# Patient Record
Sex: MALE | Employment: FULL TIME | URBAN - METROPOLITAN AREA
[De-identification: names, ages, dates, MRNs, and addresses within clinical notes are randomized per-mention and may not be internally consistent; named-entity substitution may affect disease eponyms.]

---
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
## 2023-01-10 DIAGNOSIS — F98.8 ATTENTION DEFICIT DISORDER (ADD) IN ADULT: ICD-10-CM

## 2023-01-10 RX ORDER — DEXTROAMPHETAMINE SACCHARATE, AMPHETAMINE ASPARTATE MONOHYDRATE, DEXTROAMPHETAMINE SULFATE AND AMPHETAMINE SULFATE 6.25; 6.25; 6.25; 6.25 MG/1; MG/1; MG/1; MG/1
25 CAPSULE, EXTENDED RELEASE ORAL EVERY MORNING
Qty: 30 CAPSULE | Refills: 0 | Status: SHIPPED | OUTPATIENT
Start: 2023-01-10 | End: 2023-02-21 | Stop reason: SDUPTHER

## 2023-01-10 NOTE — TELEPHONE ENCOUNTER
Caller: Darrian Son    Relationship: Self    Best call back number:     [phone]    Requested Prescriptions:   Requested Prescriptions     Pending Prescriptions Disp Refills   • amphetamine-dextroamphetamine XR (Adderall XR) 25 MG 24 hr capsule 30 capsule 0     Sig: Take 1 capsule by mouth Every Morning      Pharmacy where request should be sent: [facility] [address] [place] [address] [phone] [facility] [phone] FX     Additional details provided by patient:     PATIENT STATED  HE IS OUT OF THE MEDICATION    Does the patient have less than a 3 day supply:  [x] Yes  [] No    Would you like a call back once the refill request has been completed: [x] Yes [] No    If the office needs to give you a call back, can they leave a voicemail: [x] Yes [] No    Marie Joseph Rep   01/10/23 11:30 EST     DR MERCADO

## 2023-02-21 ENCOUNTER — TELEPHONE (OUTPATIENT)
Dept: FAMILY MEDICINE CLINIC | Facility: CLINIC | Age: 18
End: 2023-02-21
Payer: COMMERCIAL

## 2023-02-21 DIAGNOSIS — F98.8 ATTENTION DEFICIT DISORDER (ADD) IN ADULT: ICD-10-CM

## 2023-02-21 RX ORDER — DEXTROAMPHETAMINE SACCHARATE, AMPHETAMINE ASPARTATE MONOHYDRATE, DEXTROAMPHETAMINE SULFATE AND AMPHETAMINE SULFATE 6.25; 6.25; 6.25; 6.25 MG/1; MG/1; MG/1; MG/1
25 CAPSULE, EXTENDED RELEASE ORAL EVERY MORNING
Qty: 30 CAPSULE | Refills: 0 | Status: SHIPPED | OUTPATIENT
Start: 2023-02-21 | End: 2023-03-14 | Stop reason: SDUPTHER

## 2023-02-21 NOTE — TELEPHONE ENCOUNTER
amphetamine-dextroamphetamine XR (Adderall XR) 25 MG 24 hr capsule    [facility] in [facility] needed to be sent there     Patient has less than 3 day supply

## 2023-02-22 ENCOUNTER — TELEPHONE (OUTPATIENT)
Dept: FAMILY MEDICINE CLINIC | Facility: CLINIC | Age: 18
End: 2023-02-22
Payer: COMMERCIAL

## 2023-03-14 DIAGNOSIS — F98.8 ATTENTION DEFICIT DISORDER (ADD) IN ADULT: ICD-10-CM

## 2023-03-14 RX ORDER — DEXTROAMPHETAMINE SACCHARATE, AMPHETAMINE ASPARTATE MONOHYDRATE, DEXTROAMPHETAMINE SULFATE AND AMPHETAMINE SULFATE 6.25; 6.25; 6.25; 6.25 MG/1; MG/1; MG/1; MG/1
25 CAPSULE, EXTENDED RELEASE ORAL EVERY MORNING
Qty: 30 CAPSULE | Refills: 0 | Status: SHIPPED | OUTPATIENT
Start: 2023-03-14 | End: 2023-03-20

## 2023-03-14 NOTE — TELEPHONE ENCOUNTER
Pt stated he is out of his adderall, and has called [facility], [facility], [facility], and [facility] in [place] and none of them have it available. Pt wondering if there's anything else he can do or take? Pt was also sent to front to schedule an appt with Dr Cuello.

## 2023-03-20 ENCOUNTER — OFFICE VISIT (OUTPATIENT)
Dept: FAMILY MEDICINE CLINIC | Facility: CLINIC | Age: 18
End: 2023-03-20
Payer: COMMERCIAL

## 2023-03-20 VITALS
OXYGEN SATURATION: 98 % | SYSTOLIC BLOOD PRESSURE: 118 MMHG | BODY MASS INDEX: 30.13 KG/M2 | DIASTOLIC BLOOD PRESSURE: 68 MMHG | TEMPERATURE: 97.7 F | HEIGHT: 77 IN | HEART RATE: 57 BPM | WEIGHT: 255.2 LBS | RESPIRATION RATE: 20 BRPM

## 2023-03-20 DIAGNOSIS — F98.8 ATTENTION DEFICIT DISORDER (ADD) IN ADULT: Primary | ICD-10-CM

## 2023-03-20 PROCEDURE — 99213 OFFICE O/P EST LOW 20 MIN: CPT | Performed by: FAMILY MEDICINE

## 2023-03-20 RX ORDER — DEXTROAMPHETAMINE SACCHARATE, AMPHETAMINE ASPARTATE, DEXTROAMPHETAMINE SULFATE AND AMPHETAMINE SULFATE 5; 5; 5; 5 MG/1; MG/1; MG/1; MG/1
20 TABLET ORAL 2 TIMES DAILY
Qty: 60 TABLET | Refills: 0 | Status: SHIPPED | OUTPATIENT
Start: 2023-03-20

## 2023-03-20 RX ORDER — DEXTROAMPHETAMINE SACCHARATE, AMPHETAMINE ASPARTATE, DEXTROAMPHETAMINE SULFATE AND AMPHETAMINE SULFATE 5; 5; 5; 5 MG/1; MG/1; MG/1; MG/1
20 TABLET ORAL DAILY
Qty: 60 TABLET | Refills: 0 | Status: SHIPPED | OUTPATIENT
Start: 2023-03-20 | End: 2023-03-20 | Stop reason: SDUPTHER

## 2023-03-20 NOTE — PROGRESS NOTES
"Chief Complaint   Patient presents with   • Follow-up   • ADD       Subjective      Darrian Son is a 17 y.o. who presents for ADHD follow-up.  Patient has run into difficulties obtaining Adderall XR due to nationwide shortage.  It was time for follow-up and primary issue to address is alternative medications.  Patient notes improvement in focus and concentration as well as ability to retain information with use of medication.  Apparently it is quite obvious when he has not taken his medication.    Objective   Vital Signs:  /68   Pulse 57   Temp 97.7 °F (36.5 °C)   Resp 20   Ht 195.6 cm (77\")   Wt 116 kg (255 lb 3.2 oz)   SpO2 98%   BMI 30.26 kg/m²     Physical Exam  Constitutional:       Appearance: Normal appearance.   Cardiovascular:      Rate and Rhythm: Regular rhythm.      Pulses: Normal pulses.      Heart sounds: Normal heart sounds.   Pulmonary:      Effort: Pulmonary effort is normal.   Neurological:      Mental Status: He is alert.          Result Review                     Assessment and Plan  Diagnoses and all orders for this visit:    1. Attention deficit disorder (ADD) in adult (Primary)  -     Discontinue: amphetamine-dextroamphetamine (Adderall) 20 MG tablet; Take 1 tablet by mouth Daily.  Dispense: 60 tablet; Refill: 0  -     amphetamine-dextroamphetamine (Adderall) 20 MG tablet; Take 1 tablet by mouth 2 (Two) Times a Day. Disregard first rx that says once daily  Dispense: 60 tablet; Refill: 0            Follow Up  No follow-ups on file.  Patient was given instructions and counseling regarding his condition or for health maintenance advice. Please see specific information pulled into the AVS if appropriate.       "

## 2023-05-10 DIAGNOSIS — F98.8 ATTENTION DEFICIT DISORDER (ADD) IN ADULT: ICD-10-CM

## 2023-05-10 RX ORDER — DEXTROAMPHETAMINE SACCHARATE, AMPHETAMINE ASPARTATE, DEXTROAMPHETAMINE SULFATE AND AMPHETAMINE SULFATE 5; 5; 5; 5 MG/1; MG/1; MG/1; MG/1
20 TABLET ORAL 2 TIMES DAILY
Qty: 60 TABLET | Refills: 0 | Status: SHIPPED | OUTPATIENT
Start: 2023-05-10

## 2023-05-10 NOTE — TELEPHONE ENCOUNTER
Caller: Darrian Son    Relationship: Self    Best call back number: [phone]    Requested Prescriptions:   Requested Prescriptions     Pending Prescriptions Disp Refills   • amphetamine-dextroamphetamine (Adderall) 20 MG tablet 60 tablet 0     Sig: Take 1 tablet by mouth 2 (Two) Times a Day. Disregard first rx that says once daily        Pharmacy where request should be sent: [place] [facility] [phone] [place] [phone]      Last office visit with prescribing clinician: 3/20/2023   Last telemedicine visit with prescribing clinician: 3/20/2023   Next office visit with prescribing clinician: Visit date not found     Additional details provided by patient: PATIENT STATES HE HAS 2 DAYS LEFT OF HIS PRESCRIPTION.     PATIENT IS REQUESTING TO HAVE THE LONG TERM 25 MG DOSAGE AS HE USED TO BE ON IT BUT SWITCHED TO 20 MG BECAUSE THEY WERE OUT OF THE 25 MG.     Does the patient have less than a 3 day supply:  [x] Yes  [] No    Marie Burr Rep   05/10/23 11:05 EDT

## 2023-07-14 ENCOUNTER — TELEPHONE (OUTPATIENT)
Dept: FAMILY MEDICINE CLINIC | Facility: CLINIC | Age: 18
End: 2023-07-14

## 2023-07-14 NOTE — TELEPHONE ENCOUNTER
Caller: Darrian Son    Relationship: Self    Best call back number: [phone]    Who are you requesting to speak with (clinical staff, provider,  specific staff member): CLINICAL     What was the call regarding: ADDERRAL: PATIENT WAS SWITCHED TO REGULAR ADDERALL BECAUSE THE PHARMACY WAS OUT.  PATIENT IS ASKING TO BE OUT BACK ON THE ADDERRAL XR.

## 2023-09-05 DIAGNOSIS — F98.8 ATTENTION DEFICIT DISORDER (ADD) IN ADULT: ICD-10-CM

## 2023-09-05 RX ORDER — DEXTROAMPHETAMINE SACCHARATE, AMPHETAMINE ASPARTATE MONOHYDRATE, DEXTROAMPHETAMINE SULFATE AND AMPHETAMINE SULFATE 5; 5; 5; 5 MG/1; MG/1; MG/1; MG/1
20 CAPSULE, EXTENDED RELEASE ORAL EVERY MORNING
Qty: 30 CAPSULE | Refills: 0 | Status: SHIPPED | OUTPATIENT
Start: 2023-09-05

## 2023-09-05 RX ORDER — DEXTROAMPHETAMINE SACCHARATE, AMPHETAMINE ASPARTATE, DEXTROAMPHETAMINE SULFATE AND AMPHETAMINE SULFATE 5; 5; 5; 5 MG/1; MG/1; MG/1; MG/1
20 TABLET ORAL 2 TIMES DAILY
Qty: 60 TABLET | Refills: 0 | Status: CANCELLED | OUTPATIENT
Start: 2023-09-05

## 2023-09-05 NOTE — TELEPHONE ENCOUNTER
Caller: Darrian Son    Relationship: Self    Best call back number: [phone]     Requested Prescriptions:   Requested Prescriptions     Pending Prescriptions Disp Refills    amphetamine-dextroamphetamine (Adderall) 20 MG tablet 60 tablet 0     Sig: Take 1 tablet by mouth 2 (Two) Times a Day. Disregard first rx that says once daily        Pharmacy where request should be sent: [facility] [address] [place] [address] [phone] [facility] [phone] FX     Last office visit with prescribing clinician: 3/20/2023   Last telemedicine visit with prescribing clinician: Visit date not found   Next office visit with prescribing clinician: Visit date not found     Additional details provided by patient: has none left, asking for the xr version    Does the patient have less than a 3 day supply:  [x] Yes  [] No    Would you like a call back once the refill request has been completed: [x] Yes [] No    If the office needs to give you a call back, can they leave a voicemail: [] Yes [] No    Marie Sherman Rep   09/05/23 11:17 EDT

## 2023-10-11 DIAGNOSIS — F98.8 ATTENTION DEFICIT DISORDER (ADD) IN ADULT: ICD-10-CM

## 2023-10-11 NOTE — TELEPHONE ENCOUNTER
Caller: Darrian Son    Relationship: Self    Best call back number: [phone]     Requested Prescriptions:   Requested Prescriptions     Pending Prescriptions Disp Refills    amphetamine-dextroamphetamine XR (Adderall XR) 20 MG 24 hr capsule 30 capsule 0     Sig: Take 1 capsule by mouth Every Morning        Pharmacy where request should be sent: [facility] [address] [place] [address] [phone] [facility] [phone] FX     Last office visit with prescribing clinician: 3/20/2023   Last telemedicine visit with prescribing clinician: Visit date not found   Next office visit with prescribing clinician: Visit date not found     Additional details provided by patient: PATIENT HAS 4 DAYS REMAINING.    Does the patient have less than a 3 day supply:  [] Yes  [x] No    Would you like a call back once the refill request has been completed: [] Yes [x] No    If the office needs to give you a call back, can they leave a voicemail: [] Yes [x] No    Marie Tian Rep   10/11/23 14:50 EDT

## 2023-10-12 RX ORDER — DEXTROAMPHETAMINE SACCHARATE, AMPHETAMINE ASPARTATE MONOHYDRATE, DEXTROAMPHETAMINE SULFATE AND AMPHETAMINE SULFATE 5; 5; 5; 5 MG/1; MG/1; MG/1; MG/1
20 CAPSULE, EXTENDED RELEASE ORAL EVERY MORNING
Qty: 30 CAPSULE | Refills: 0 | Status: SHIPPED | OUTPATIENT
Start: 2023-10-12

## 2023-11-15 DIAGNOSIS — F98.8 ATTENTION DEFICIT DISORDER (ADD) IN ADULT: ICD-10-CM

## 2023-11-15 RX ORDER — DEXTROAMPHETAMINE SACCHARATE, AMPHETAMINE ASPARTATE MONOHYDRATE, DEXTROAMPHETAMINE SULFATE AND AMPHETAMINE SULFATE 5; 5; 5; 5 MG/1; MG/1; MG/1; MG/1
20 CAPSULE, EXTENDED RELEASE ORAL EVERY MORNING
Qty: 30 CAPSULE | Refills: 0 | Status: SHIPPED | OUTPATIENT
Start: 2023-11-15

## 2023-11-15 NOTE — TELEPHONE ENCOUNTER
Caller: Darrian Son    Relationship: Self    Best call back number:       [phone] (Mobile)     Requested Prescriptions:   Requested Prescriptions     Pending Prescriptions Disp Refills    amphetamine-dextroamphetamine XR (Adderall XR) 20 MG 24 hr capsule 30 capsule 0     Sig: Take 1 capsule by mouth Every Morning      Pharmacy where request should be sent:     [facility] [address] [place] [address] [phone] [facility] [phone]      Last office visit with prescribing clinician: 3/20/2023   Last telemedicine visit with prescribing clinician: Visit date not found   Next office visit with prescribing clinician: Visit date not found     Additional details provided by patient:     PATIENT STATED HE IS COMPLETELY OUT OF MEDICATION    Does the patient have less than a 3 day supply:  [x] Yes  [] No    Would you like a call back once the refill request has been completed: [] Yes [] No    If the office needs to give you a call back, can they leave a voicemail: [] Yes [] No    Marie Joseph Rep   11/15/23 08:53 EST

## 2023-12-14 DIAGNOSIS — F98.8 ATTENTION DEFICIT DISORDER (ADD) IN ADULT: ICD-10-CM

## 2023-12-14 RX ORDER — DEXTROAMPHETAMINE SACCHARATE, AMPHETAMINE ASPARTATE MONOHYDRATE, DEXTROAMPHETAMINE SULFATE AND AMPHETAMINE SULFATE 5; 5; 5; 5 MG/1; MG/1; MG/1; MG/1
20 CAPSULE, EXTENDED RELEASE ORAL EVERY MORNING
Qty: 30 CAPSULE | Refills: 0 | Status: SHIPPED | OUTPATIENT
Start: 2023-12-14

## 2023-12-14 NOTE — TELEPHONE ENCOUNTER
Caller: Darrian Sno    Relationship: Self    Best call back number: [phone]     Requested Prescriptions:   Requested Prescriptions     Pending Prescriptions Disp Refills    amphetamine-dextroamphetamine XR (Adderall XR) 20 MG 24 hr capsule 30 capsule 0     Sig: Take 1 capsule by mouth Every Morning        Pharmacy where request should be sent: [facility] [address] [place] [address] [phone] [facility] [phone] FX     Last office visit with prescribing clinician: 3/20/2023   Last telemedicine visit with prescribing clinician: Visit date not found   Next office visit with prescribing clinician: Visit date not found     Additional details provided by patient: PATIENT HAS CALLED REQUESTING A NEW PRESCRIPTION ON ABOVE MEDICATION TO BE SENT TO [facility] IN [place].PATIENT IS REQUESTING A 90 DAY PRESCRIPTION. PATIENT WILL BE OUT OF TOWN ON A MISSION AND IS REQUESTING A 90 DAY PRESCRIPTION.    Does the patient have less than a 3 day supply:  [x] Yes  [] No    Would you like a call back once the refill request has been completed: [] Yes [x] No    If the office needs to give you a call back, can they leave a voicemail: [] Yes [x] No    Brown Hale   12/14/23 11:18 EST

## 2024-01-16 DIAGNOSIS — F98.8 ATTENTION DEFICIT DISORDER (ADD) IN ADULT: ICD-10-CM

## 2024-01-16 RX ORDER — DEXTROAMPHETAMINE SACCHARATE, AMPHETAMINE ASPARTATE MONOHYDRATE, DEXTROAMPHETAMINE SULFATE AND AMPHETAMINE SULFATE 5; 5; 5; 5 MG/1; MG/1; MG/1; MG/1
20 CAPSULE, EXTENDED RELEASE ORAL EVERY MORNING
Qty: 30 CAPSULE | Refills: 0 | Status: SHIPPED | OUTPATIENT
Start: 2024-01-16

## 2024-01-16 NOTE — TELEPHONE ENCOUNTER
Caller: Darrian Son    Relationship: Self    Best call back number: [phone]     Requested Prescriptions:   Requested Prescriptions     Pending Prescriptions Disp Refills    amphetamine-dextroamphetamine XR (Adderall XR) 20 MG 24 hr capsule 30 capsule 0     Sig: Take 1 capsule by mouth Every Morning      Pharmacy where request should be sent: [facility] [address] [place] [address] [phone] [facility] [phone] FX     Last office visit with prescribing clinician: 3/20/2023   Last telemedicine visit with prescribing clinician: Visit date not found   Next office visit with prescribing clinician: Visit date not found     Additional details provided by patient: PATIENT HAS 2 LEFT.  THE PATIENT IS REQUESTING A 60 DAY SUPPLY. HE WILL BE GOING HOME TO [place] SOON FOR A BIT.     Does the patient have less than a 3 day supply:  [x] Yes  [] No    Would you like a call back once the refill request has been completed: [] Yes [x] No    If the office needs to give you a call back, can they leave a voicemail: [] Yes [x] No    Marie Hanna Rep   01/16/24 11:56 EST

## 2024-02-14 DIAGNOSIS — F98.8 ATTENTION DEFICIT DISORDER (ADD) IN ADULT: ICD-10-CM

## 2024-02-15 RX ORDER — DEXTROAMPHETAMINE SACCHARATE, AMPHETAMINE ASPARTATE MONOHYDRATE, DEXTROAMPHETAMINE SULFATE AND AMPHETAMINE SULFATE 5; 5; 5; 5 MG/1; MG/1; MG/1; MG/1
20 CAPSULE, EXTENDED RELEASE ORAL EVERY MORNING
Qty: 30 CAPSULE | Refills: 0 | Status: SHIPPED | OUTPATIENT
Start: 2024-02-15